# Patient Record
Sex: FEMALE | Race: OTHER | NOT HISPANIC OR LATINO | URBAN - METROPOLITAN AREA
[De-identification: names, ages, dates, MRNs, and addresses within clinical notes are randomized per-mention and may not be internally consistent; named-entity substitution may affect disease eponyms.]

---

## 2018-12-13 ENCOUNTER — EMERGENCY (EMERGENCY)
Facility: HOSPITAL | Age: 28
LOS: 0 days | Discharge: HOME | End: 2018-12-13
Admitting: PHYSICIAN ASSISTANT

## 2018-12-13 VITALS
RESPIRATION RATE: 16 BRPM | SYSTOLIC BLOOD PRESSURE: 118 MMHG | WEIGHT: 134.92 LBS | TEMPERATURE: 97 F | OXYGEN SATURATION: 100 % | DIASTOLIC BLOOD PRESSURE: 56 MMHG | HEART RATE: 63 BPM

## 2018-12-13 DIAGNOSIS — Z79.2 LONG TERM (CURRENT) USE OF ANTIBIOTICS: ICD-10-CM

## 2018-12-13 DIAGNOSIS — L53.9 ERYTHEMATOUS CONDITION, UNSPECIFIED: ICD-10-CM

## 2018-12-13 DIAGNOSIS — H57.11 OCULAR PAIN, RIGHT EYE: ICD-10-CM
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
## 2018-12-13 DIAGNOSIS — S05.01XA INJURY OF CONJUNCTIVA AND CORNEAL ABRASION WITHOUT FOREIGN BODY, RIGHT EYE, INITIAL ENCOUNTER: ICD-10-CM
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
## 2018-12-13 DIAGNOSIS — Z23 ENCOUNTER FOR IMMUNIZATION: ICD-10-CM

## 2018-12-13 RX ORDER — TETANUS TOXOID, REDUCED DIPHTHERIA TOXOID AND ACELLULAR PERTUSSIS VACCINE, ADSORBED 5; 2.5; 8; 8; 2.5 [IU]/.5ML; [IU]/.5ML; UG/.5ML; UG/.5ML; UG/.5ML
0.5 SUSPENSION INTRAMUSCULAR ONCE
Qty: 0 | Refills: 0 | Status: COMPLETED | OUTPATIENT
Start: 2018-12-13 | End: 2018-12-13

## 2018-12-13 RX ORDER — OFLOXACIN 0.3 %
2 DROPS OPHTHALMIC (EYE) ONCE
Qty: 0 | Refills: 0 | Status: COMPLETED | OUTPATIENT
Start: 2018-12-13 | End: 2018-12-13

## 2018-12-13 RX ADMIN — TETANUS TOXOID, REDUCED DIPHTHERIA TOXOID AND ACELLULAR PERTUSSIS VACCINE, ADSORBED 0.5 MILLILITER(S): 5; 2.5; 8; 8; 2.5 SUSPENSION INTRAMUSCULAR at 17:29

## 2018-12-13 RX ADMIN — Medication 2 DROP(S): at 17:31

## 2018-12-13 NOTE — ED PROCEDURE NOTE - PROCEDURE ADDITIONAL DETAILS
Fluorescein uptake to lateral aspect of right eye at 9 o clock position that goes superficially over lateral aspect of cornea.

## 2018-12-13 NOTE — ED PROVIDER NOTE - NS ED ROS FT
GEN: (-) fever, (-) chills  HEENT: (+) eye pain, (+) eye redness, (-) blurry vision, (-) HA  CV: (-) chest pain, (-) palpitations, (-) edema  SKIN: (-) rashes, (-) new lesions, (-) pruritus, (-) jaundice  HEME: (-) bleeding, (-) ecchymosis

## 2018-12-13 NOTE — ED PROVIDER NOTE - MEDICAL DECISION MAKING DETAILS
Patient started on ofloxacin eye drops and Augmentin sent to Pharmacy. Patient given tdap. Patient to follow up with Ophthalmologist clinic. Patient educated on signs of infection. Patient instructed not wear contacts for 10days. Patient endorsed understanding.

## 2018-12-13 NOTE — ED PROVIDER NOTE - NSFOLLOWUPINSTRUCTIONS_ED_ALL_ED_FT
Corneal Abrasion    The cornea is the clear covering at the front and center of the eye. This very thin tissue is made up of many layers. If a scratch or injury causes the corneal epithelium to come off, it is called a corneal abrasion. Symptoms include eye pain, redness, tearing, difficulty keeping eye open, and light sensitivity. Do not drive or operate machinery if your eye is patched.  Antibiotic eye drops may be prescribed to reduce the risk of infection.  It is important to follow up with an ophthalmologist (eye doctor) to ensure proper healing.    SEEK IMMEDIATE MEDICAL CARE IF YOU HAVE ANY OF THE FOLLOWING SYMPTOMS: discharge from eyes, changes in vision, fever, or swelling.      Place 2 drops of ofloxacin in right eye 4 times a day for 5 days. Corneal Abrasion    The cornea is the clear covering at the front and center of the eye. This very thin tissue is made up of many layers. If a scratch or injury causes the corneal epithelium to come off, it is called a corneal abrasion. Symptoms include eye pain, redness, tearing, difficulty keeping eye open, and light sensitivity. Do not drive or operate machinery if your eye is patched.  Antibiotic eye drops may be prescribed to reduce the risk of infection.  It is important to follow up with an ophthalmologist (eye doctor) to ensure proper healing.    SEEK IMMEDIATE MEDICAL CARE IF YOU HAVE ANY OF THE FOLLOWING SYMPTOMS: discharge from eyes, changes in vision, fever, or swelling.      Place 2 drops of ofloxacin in right eye 4 times a day for 7 days.

## 2018-12-13 NOTE — ED PROVIDER NOTE - OBJECTIVE STATEMENT
The patient is a 28y Female with significant PMH is presenting to ED with scratch to the right eye 1hr ago. Patient states her cat scratched her right eye. She states it is irritated and eye redness to the lateral aspect of eye. She denies blurry vision, pain with eye movements, fever/chills, and eye swelling. Patient states her tetanus is not up to date. Patient wears contacts and glasses but states did not have them in tonight.

## 2018-12-13 NOTE — ED PROVIDER NOTE - NSFOLLOWUPCLINICS_GEN_ALL_ED_FT
[facility]  Ophthalmolgy  [address], [address]  [place] [address]  Phone: [phone]  Fax:   Follow Up Time:

## 2018-12-13 NOTE — ED PROVIDER NOTE - PHYSICAL EXAMINATION
GEN: Alert & Oriented x 3, No acute distress. Calm, appropriate.  ENT:Oral mucosa pink, moist without lesions. No pharyngeal injection noted. No exudate. TM clear bilaterally.  Eyes: PERRL. EOMI. Conjunctival injection noted to lateral aspect of right eye. Vision OD/OS/OU 20/20. Fluorescein uptake to lateral aspect of right eye at 9 o clock position that extends superficially over lateral aspect of cornea. No periorbital swelling, or tenderness with palpation.  RESP: Lungs clear to auscult bilat. no wheezes, rhonchi or rales. No retractions. Equal air entry.  CARDIO: regular rate and rhythm, no murmurs, rubs or gallops. Normal S1, S2.   SKIN: no lacerations to eyelid or periorbital.

## 2020-02-14 ENCOUNTER — EMERGENCY (EMERGENCY)
Facility: HOSPITAL | Age: 30
LOS: 0 days | Discharge: HOME | End: 2020-02-14
Admitting: EMERGENCY MEDICINE
Payer: MEDICAID

## 2020-02-14 VITALS
SYSTOLIC BLOOD PRESSURE: 128 MMHG | RESPIRATION RATE: 18 BRPM | OXYGEN SATURATION: 98 % | HEART RATE: 79 BPM | DIASTOLIC BLOOD PRESSURE: 79 MMHG | TEMPERATURE: 98 F
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
## 2020-02-14 DIAGNOSIS — S80.211A ABRASION, RIGHT KNEE, INITIAL ENCOUNTER: ICD-10-CM
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
## 2020-02-14 DIAGNOSIS — S93.402A SPRAIN OF UNSPECIFIED LIGAMENT OF LEFT ANKLE, INITIAL ENCOUNTER: ICD-10-CM

## 2020-02-14 DIAGNOSIS — M25.572 PAIN IN LEFT ANKLE AND JOINTS OF LEFT FOOT: ICD-10-CM
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
## 2020-02-14 PROCEDURE — 73610 X-RAY EXAM OF ANKLE: CPT | Mod: 26,LT

## 2020-02-14 PROCEDURE — 99284 EMERGENCY DEPT VISIT MOD MDM: CPT

## 2020-02-14 PROCEDURE — 73562 X-RAY EXAM OF KNEE 3: CPT | Mod: 26,RT

## 2020-02-14 RX ORDER — IBUPROFEN 200 MG
600 TABLET ORAL ONCE
Refills: 0 | Status: COMPLETED | OUTPATIENT
Start: 2020-02-14 | End: 2020-02-14

## 2020-02-14 RX ADMIN — Medication 600 MILLIGRAM(S): at 11:19

## 2020-02-14 NOTE — ED PROVIDER NOTE - CARE PLAN
Principal Discharge DX:	Left ankle sprain  Secondary Diagnosis:	Right knee injury, initial encounter

## 2020-02-14 NOTE — ED ADULT NURSE NOTE - OBJECTIVE STATEMENT
Patient c/o left ankle injury since last night. Patient states she was walking in [place] and tripped over curb and fell. Patient states she was able to apply pressure to leg last night but now pain has increased. On assessment left ankle swelling noted, bruising present o outer ankle, no signs of deformity noted + pulses present in left foot.

## 2020-02-14 NOTE — ED PROVIDER NOTE - OBJECTIVE STATEMENT
30 y/o female no PMhx presents to the ED c/o "I slipped and fell [place] last night. I have pain and swelling to my left ankle. It's painful to put weight on it. I also injured my right knee." no head trauma or LOC Tetanus UTD

## 2020-02-14 NOTE — ED PROVIDER NOTE - NSFOLLOWUPINSTRUCTIONS_ED_ALL_ED_FT
Ankle Sprain    An ankle sprain is a stretch or tear in one of the tough, fiber-like tissues (ligaments) in the ankle. The ligaments in your ankle help to hold the bones of the ankle together.     CAUSES  This condition is often caused by stepping on or falling on the outer edge of the foot.    RISK FACTORS  This condition is more likely to develop in people who play sports.    SYMPTOMS  Symptoms of this condition include:    Pain in your ankle.  Swelling.  Bruising. Bruising may develop right after you sprain your ankle or 1–2 days later.  Trouble standing or walking, especially when you turn or change directions.    DIAGNOSIS  This condition is diagnosed with a physical exam. During the exam, your health care provider will press on certain parts of your foot and ankle and try to move them in certain ways. X-rays may be taken to see how severe the sprain is and to check for broken bones.    TREATMENT  This condition may be treated with:    A brace. This is used to keep the ankle from moving until it heals.  An elastic bandage. This is used to support the ankle.  Crutches.  Pain medicine.  Surgery. This may be needed if the sprain is severe.  Physical therapy. This may help to improve the range of motion in the ankle.    HOME CARE INSTRUCTIONS  Rest your ankle.  Take over-the-counter and prescription medicines only as told by your health care provider.  For 2–3 days, keep your ankle raised (elevated) above the level of your heart as much as possible.  If directed, apply ice to the area:  Put ice in a plastic bag.  Place a towel between your skin and the bag.  Leave the ice on for 20 minutes, 2–3 times a day.  If you were given a brace:  Wear it as directed.  Remove it to shower or bathe.  Try not to move your ankle much, but wiggle your toes from time to time. This helps to prevent swelling.  If you were given an elastic bandage (dressing):  Remove it to shower or bathe.  Try not to move your ankle much, but wiggle your toes from time to time. This helps to prevent swelling.  Adjust the dressing to make it more comfortable if it feels too tight.  Loosen the dressing if you have numbness or tingling in your foot, or if your foot becomes cold and blue.  If you have crutches, use them as told by your health care provider. Continue to use them until you can walk without feeling pain in your ankle.    SEEK MEDICAL CARE IF:  You have rapidly increasing bruising or swelling.  Your pain is not relieved with medicine.    SEEK IMMEDIATE MEDICAL CARE IF:  Your toes or foot becomes numb or blue.  You have severe pain that gets worse.    Abrasion  ImageAn abrasion is a cut or a scrape on the outer surface of the skin. An abrasion does not go through all the layers of the skin. It is important to care for your abrasion properly to prevent infection.    What are the causes?  This condition is caused by falling on or gliding across the ground or another surface. When your skin rubs on something, the outer and inner layers of skin may rub off.    What are the signs or symptoms?  The main symptom of this condition is a cut or a scrape. The scrape may be bleeding, or it may appear red or pink. If the abrasion was caused by a fall, there may be a bruise under the cut or scrape.    How is this diagnosed?  An abrasion is diagnosed with a physical exam.    How is this treated?  Treatment for this condition depends on how large and deep the abrasion is. In most cases:    Your abrasion will be cleaned with water and mild soap. This is done to remove any dirt or debris (such as particles of glass or rock) that may be stuck in the wound.  An antibiotic ointment may be applied to the abrasion to help prevent infection.  A bandage (dressing) may be placed on the abrasion to keep it clean.    You may also need a tetanus shot.    Follow these instructions at home:  Medicines     Take or apply over-the-counter and prescription medicines only as told by your health care provider.  If you were prescribed an antibiotic medicine, apply it as told by your health care provider.  Wound care     Clean the wound 2–3 times a day, or as directed by your health care provider. To do this, wash the wound with mild soap and water, rinse off the soap, and pat the wound dry with a clean towel. Do not rub the wound.  Keep the dressing clean and dry as told by your health care provider.  There are many different ways to close and cover a wound. Follow instructions from your health care provider about:    Caring for your wound.  Changing and removing your dressing. You may have to change your dressing one or more times a day, or as directed by your health care provider.    Check your wound every day for signs of infection. Check for:    Redness, particularly a red streak that spreads out from the wound.  Swelling or increased pain.  Warmth.  Fluid, pus, or a bad smell.    If directed, put ice on the injured area to reduce pain and swelling:    Put ice in a plastic bag.   Place a towel between your skin and the bag.   Leave the ice on for 20 minutes, 2–3 times a day.    General instructions     Do not take baths, swim, or use a hot tub until your health care provider says it is okay to do so.  If possible, raise (elevate) the injured area above the level of your heart while you are sitting or lying down. This will reduce pain and swelling.  Keep all follow-up visits as directed by your health care provider. This is important.  Contact a health care provider if:  You received a tetanus shot, and you have swelling, severe pain, redness, or bleeding at the injection site.  Your pain is not controlled with medicine.  You have redness, swelling, or more pain at the site of your wound.  Get help right away if:  You have a red streak spreading away from your wound.  You have a fever.  You have fluid, blood, or pus coming from your wound.  You notice a bad smell coming from your wound or your dressing.  Summary  An abrasion is a cut or a scrape on the outer surface of the skin. An abrasion does not go through all the layers of the skin.  Care for your abrasion properly to prevent infection.  Clean the wound with mild soap and water 2–3 times a day. Follow instructions from your health care provider about taking medicines and changing your bandage (dressing).  Contact your health care provider if you have redness, swelling or more pain in the wound area.  Get help right away if you have a fever or if you have fluid, blood, pus, a bad smell, or a red streak coming from the wound.  This information is not intended to replace advice given to you by your health care provider. Make sure you discuss any questions you have with your health care provider.

## 2020-02-14 NOTE — ED PROVIDER NOTE - CARE PROVIDERS DIRECT ADDRESSES
[email or web address]

## 2020-02-14 NOTE — ED PROVIDER NOTE - CARE PROVIDER_API CALL
Delano Ryan (MD)  Orthopaedic Surgery  [address] [place] [address]  Phone: [phone]  Fax: [phone]  Follow Up Time:

## 2021-09-29 NOTE — ED ADULT NURSE NOTE - CAS TRG GEN SKIN COLOR
Normal for race Brayan Renteria)  Plastic Surgery  [address] [place] [address]  Phone: [phone]  Fax: [phone]  Established Patient  Follow Up Time: 1-3 days

## 2023-06-05 NOTE — ED PROVIDER NOTE - PATIENT PORTAL LINK FT
Last appointment this department: 12/14/2022  Next appointment this department: Visit date not found You can access the FollowMyHealth Patient Portal offered by [facility] by registering at the following website: [email or web address] By joining [employer]’s FollowMyHealth portal, you will also be able to view your health information using other applications (apps) compatible with our system.
